# Patient Record
Sex: MALE | Race: WHITE | Employment: FULL TIME | URBAN - METROPOLITAN AREA
[De-identification: names, ages, dates, MRNs, and addresses within clinical notes are randomized per-mention and may not be internally consistent; named-entity substitution may affect disease eponyms.]

---

## 2021-04-23 ENCOUNTER — OFFICE VISIT (OUTPATIENT)
Dept: PRIMARY CARE CLINIC | Age: 21
End: 2021-04-23
Payer: COMMERCIAL

## 2021-04-23 VITALS
HEIGHT: 66 IN | HEART RATE: 68 BPM | WEIGHT: 234.6 LBS | OXYGEN SATURATION: 97 % | DIASTOLIC BLOOD PRESSURE: 88 MMHG | BODY MASS INDEX: 37.7 KG/M2 | SYSTOLIC BLOOD PRESSURE: 138 MMHG

## 2021-04-23 DIAGNOSIS — E66.9 OBESITY (BMI 35.0-39.9 WITHOUT COMORBIDITY): Chronic | ICD-10-CM

## 2021-04-23 DIAGNOSIS — R53.82 CHRONIC FATIGUE: ICD-10-CM

## 2021-04-23 DIAGNOSIS — R63.5 WEIGHT GAIN: ICD-10-CM

## 2021-04-23 DIAGNOSIS — F41.1 GENERALIZED ANXIETY DISORDER: Primary | Chronic | ICD-10-CM

## 2021-04-23 DIAGNOSIS — Z13.1 SCREENING FOR DIABETES MELLITUS: ICD-10-CM

## 2021-04-23 DIAGNOSIS — L65.9 HAIR THINNING: ICD-10-CM

## 2021-04-23 PROCEDURE — 99204 OFFICE O/P NEW MOD 45 MIN: CPT | Performed by: NURSE PRACTITIONER

## 2021-04-23 RX ORDER — CHOLECALCIFEROL (VITAMIN D3) 125 MCG
1 CAPSULE ORAL DAILY
Qty: 30 CAPSULE | Refills: 5 | Status: SHIPPED | OUTPATIENT
Start: 2021-04-23 | End: 2021-08-20 | Stop reason: SDUPTHER

## 2021-04-23 RX ORDER — BUPROPION HYDROCHLORIDE 150 MG/1
150 TABLET ORAL EVERY MORNING
Qty: 30 TABLET | Refills: 3 | Status: SHIPPED | OUTPATIENT
Start: 2021-04-23 | End: 2021-08-20 | Stop reason: SDUPTHER

## 2021-04-23 SDOH — ECONOMIC STABILITY: INCOME INSECURITY: HOW HARD IS IT FOR YOU TO PAY FOR THE VERY BASICS LIKE FOOD, HOUSING, MEDICAL CARE, AND HEATING?: NOT HARD AT ALL

## 2021-04-23 SDOH — ECONOMIC STABILITY: TRANSPORTATION INSECURITY
IN THE PAST 12 MONTHS, HAS THE LACK OF TRANSPORTATION KEPT YOU FROM MEDICAL APPOINTMENTS OR FROM GETTING MEDICATIONS?: NOT ASKED

## 2021-04-23 SDOH — ECONOMIC STABILITY: FOOD INSECURITY: WITHIN THE PAST 12 MONTHS, YOU WORRIED THAT YOUR FOOD WOULD RUN OUT BEFORE YOU GOT MONEY TO BUY MORE.: NEVER TRUE

## 2021-04-23 SDOH — HEALTH STABILITY: MENTAL HEALTH: HOW OFTEN DO YOU HAVE A DRINK CONTAINING ALCOHOL?: 2-4 TIMES A MONTH

## 2021-04-23 SDOH — ECONOMIC STABILITY: FOOD INSECURITY: WITHIN THE PAST 12 MONTHS, THE FOOD YOU BOUGHT JUST DIDN'T LAST AND YOU DIDN'T HAVE MONEY TO GET MORE.: NEVER TRUE

## 2021-04-23 ASSESSMENT — PATIENT HEALTH QUESTIONNAIRE - PHQ9
SUM OF ALL RESPONSES TO PHQ QUESTIONS 1-9: 1
2. FEELING DOWN, DEPRESSED OR HOPELESS: 0
SUM OF ALL RESPONSES TO PHQ QUESTIONS 1-9: 1

## 2021-04-23 NOTE — PROGRESS NOTES
Noted    Obesity (BMI 35.0-39.9 without comorbidity) 04/27/2021    Generalized anxiety disorder 04/23/2021    Pilonidal cyst without abscess 07/01/2014     Patient's BMI is Body mass index is 37.87 kg/m². Social History     Tobacco Use    Smoking status: Never Smoker    Smokeless tobacco: Never Used   Substance Use Topics    Alcohol use: Yes     Alcohol/week: 4.0 standard drinks     Types: 4 Cans of beer per week     Frequency: 2-4 times a month     Drinks per session: 3 or 4    Drug use: Yes     Frequency: 1.0 times per week     Types: Marijuana      Past Medical History:   Diagnosis Date    ADHD (attention deficit hyperactivity disorder)     Anxiety     Asthma     HAS NOT HAD ANY SYMPTOMS IN YEARS    Gestation period, 40 weeks 1998    VAGINAL BIRTH, BIRHT WT 7 LB 6OZ,  FORCEPTS DELIVERY, HOME FROM [facility] MOM    Pilonidal cyst 08/2013    Vision abnormalities       No data recorded     Immunization:    There is no immunization history on file for this patient. Past Surgical History:   Procedure Laterality Date    PILONIDAL CYST EXCISION  7-9-14     Family History   Problem Relation Age of Onset    Anemia Mother     Thyroid Disease Mother     Arrhythmia Father     High Blood Pressure Paternal Grandmother     Other Paternal Grandfather         PNEUMONIA     Current Outpatient Medications   Medication Sig Dispense Refill    buPROPion (WELLBUTRIN XL) 150 MG extended release tablet Take 1 tablet by mouth every morning 30 tablet 3    Cholecalciferol (VITAMIN D) 125 MCG (5000 UT) CAPS Take 1 capsule by mouth daily 30 capsule 5     No current facility-administered medications for this visit. The patient's past medical, surgical, social, and family history as well as his current medications and allergies were reviewed as documented in today's encounter. Review of Systems   Constitutional: Positive for fatigue and unexpected weight change. HENT: Negative. Eyes: Negative. Respiratory: Negative. Cardiovascular: Negative. Gastrointestinal: Negative. Endocrine: Negative. Genitourinary: Negative. Musculoskeletal: Negative. Skin: Negative. Allergic/Immunologic: Negative. Neurological: Negative. Hematological: Negative. Psychiatric/Behavioral: Positive for decreased concentration. The patient is nervous/anxious. All other systems reviewed and are negative. /88 (Site: Left Upper Arm, Position: Sitting, Cuff Size: Large Adult)   Pulse 68   Ht 5' 6\" (1.676 m)   Wt 234 lb 9.6 oz (106.4 kg)   SpO2 97%   BMI 37.87 kg/m²   Physical Exam  Vitals signs and nursing note reviewed. Constitutional:       Appearance: Normal appearance. He is normal weight. HENT:      Right Ear: Tympanic membrane, ear canal and external ear normal.      Left Ear: Tympanic membrane, ear canal and external ear normal.      Nose: Nose normal.      Mouth/Throat:      Mouth: Mucous membranes are moist.      Pharynx: Oropharynx is clear. Eyes:      Extraocular Movements: Extraocular movements intact. Conjunctiva/sclera: Conjunctivae normal.      Pupils: Pupils are equal, round, and reactive to light. Neck:      Musculoskeletal: Normal range of motion. Cardiovascular:      Rate and Rhythm: Normal rate and regular rhythm. Pulses: Normal pulses. Heart sounds: Normal heart sounds. Pulmonary:      Effort: Pulmonary effort is normal.      Breath sounds: Normal breath sounds. Abdominal:      General: Abdomen is flat. Bowel sounds are normal.      Palpations: Abdomen is soft. Musculoskeletal: Normal range of motion. Skin:     General: Skin is warm and dry. Capillary Refill: Capillary refill takes less than 2 seconds. Neurological:      General: No focal deficit present. Mental Status: He is alert.        No results found for: WBC, HGB, HCT, MCV, PLT  No results found for: NA, K, CL, CO2, BUN, CREATININE, GLUCOSE, CALCIUM   No results found for: ALT, AST, GGT, ALKPHOS, BILITOT  No results found for: TSHFT4, TSH  No results found for: CHOL  No results found for: TRIG  No results found for: HDL  No results found for: LDLCALC, LDLCHOLESTEROL  No results found for: CHOLHDLRATIO  No results found for: LABA1C  No results found for: LABECQBE60  No results found for: FOLATE  No results found for: IRON, TIBC, FERRITIN  No results found for: VITD25  I personally reviewed testing with patient. Otherwise labs within normal limits  ASSESSMENT AND PLAN  1. Generalized anxiety disorder    - buPROPion (WELLBUTRIN XL) 150 MG extended release tablet; Take 1 tablet by mouth every morning  Dispense: 30 tablet; Refill: 3    2. Chronic fatigue    - Cholecalciferol (VITAMIN D) 125 MCG (5000 UT) CAPS; Take 1 capsule by mouth daily  Dispense: 30 capsule; Refill: 5  - CBC With Auto Differential; Future  - Basic Metabolic Panel; Future  - TSH With Reflex Ft4; Future  - T3, Free; Future  - Thyroid Peroxidase Antibody; Future    3. Screening for diabetes mellitus    - Basic Metabolic Panel; Future    4. Hair thinning    - CBC With Auto Differential; Future  - Basic Metabolic Panel; Future  - TSH With Reflex Ft4; Future  - T3, Free; Future  - Thyroid Peroxidase Antibody; Future    5. Weight gain    - CBC With Auto Differential; Future  - Basic Metabolic Panel; Future  - TSH With Reflex Ft4; Future  - T3, Free; Future  - Thyroid Peroxidase Antibody; Future    6. Obesity (BMI 35.0-39.9 without comorbidity)  BMI was elevated today, and weight loss plan recommended is : conventional weight loss and daily exercise regimen.      Orders Placed This Encounter   Procedures    CBC With Auto Differential     Standing Status:   Future     Standing Expiration Date:   4/23/2022    Basic Metabolic Panel     Standing Status:   Future     Standing Expiration Date:   4/23/2022    TSH With Reflex Ft4     Standing Status:   Future     Standing Expiration Date:   4/23/2022    T3, Free     Standing Status:   Future     Standing Expiration Date:   4/23/2022    Thyroid Peroxidase Antibody     Standing Status:   Future     Standing Expiration Date:   4/23/2022     Orders Placed This Encounter   Medications    buPROPion (WELLBUTRIN XL) 150 MG extended release tablet     Sig: Take 1 tablet by mouth every morning     Dispense:  30 tablet     Refill:  3    Cholecalciferol (VITAMIN D) 125 MCG (5000 UT) CAPS     Sig: Take 1 capsule by mouth daily     Dispense:  30 capsule     Refill:  5      Data Unavailable  Health Maintenance Due   Topic Date Due    COVID-19 Vaccine (1) Never done    DTaP/Tdap/Td vaccine (1 - Tdap) Never done      Medications Discontinued During This Encounter   Medication Reason    docusate sodium (COLACE) 100 MG capsule LIST CLEANUP    HYDROcodone-acetaminophen ([address]) 5-325 MG per tablet LIST CLEANUP     The patient's past medical, surgical, social, and family history as well as his   current medications and allergies were reviewed as documented in today's encounter. Medications, labs, diagnostic studies, consultations and follow-up as documented in this encounter. Return in about 2 weeks (around 5/7/2021). Future Appointments   Date Time Provider Nixon Perez   5/7/2021  4:00 PM KLAUDIA Carrillo CNP     This note was completed by using the assistance of a speech-recognition program. However, inadvertent computerized transcription errors may be present. Although every effort was made to ensure accuracy, no guarantees can be provided that every mistake has been identified and corrected by editing.   Electronically signed by KLAUDIA Rasmussen CNP on 4/27/2021 at 9:42 AM

## 2021-04-27 PROBLEM — E66.9 OBESITY (BMI 35.0-39.9 WITHOUT COMORBIDITY): Status: ACTIVE | Noted: 2021-04-27

## 2021-04-27 ASSESSMENT — ENCOUNTER SYMPTOMS
RESPIRATORY NEGATIVE: 1
GASTROINTESTINAL NEGATIVE: 1
ALLERGIC/IMMUNOLOGIC NEGATIVE: 1
EYES NEGATIVE: 1

## 2021-04-28 ENCOUNTER — HOSPITAL ENCOUNTER (OUTPATIENT)
Age: 21
Setting detail: SPECIMEN
Discharge: HOME | End: 2021-04-28
Payer: COMMERCIAL

## 2021-04-28 DIAGNOSIS — Z13.1 SCREENING FOR DIABETES MELLITUS: ICD-10-CM

## 2021-04-28 DIAGNOSIS — L65.9 HAIR THINNING: ICD-10-CM

## 2021-04-28 DIAGNOSIS — R53.82 CHRONIC FATIGUE: ICD-10-CM

## 2021-04-28 DIAGNOSIS — R63.5 WEIGHT GAIN: ICD-10-CM

## 2021-04-28 LAB
ABSOLUTE EOS #: 0.07 K/UL (ref 0–0.44)
ABSOLUTE IMMATURE GRANULOCYTE: <0.03 K/UL (ref 0–0.3)
ABSOLUTE LYMPH #: 1.89 K/UL (ref 1.1–3.7)
ABSOLUTE MONO #: 0.54 K/UL (ref 0.1–1.2)
BASOPHILS # BLD: 0 % (ref 0–2)
BASOPHILS ABSOLUTE: 0.03 K/UL (ref 0–0.2)
DIFFERENTIAL TYPE: ABNORMAL
EOSINOPHILS RELATIVE PERCENT: 1 % (ref 1–4)
HCT VFR BLD CALC: 50 % (ref 40.7–50.3)
HEMOGLOBIN: 16.2 G/DL (ref 13–17)
IMMATURE GRANULOCYTES: 0 %
LYMPHOCYTES # BLD: 25 % (ref 24–43)
MCH RBC QN AUTO: 28.2 PG (ref 25.2–33.5)
MCHC RBC AUTO-ENTMCNC: 32.4 G/DL (ref 28.4–34.8)
MCV RBC AUTO: 87.1 FL (ref 82.6–102.9)
MONOCYTES # BLD: 7 % (ref 3–12)
NRBC AUTOMATED: 0 PER 100 WBC
PDW BLD-RTO: 12.7 % (ref 11.8–14.4)
PLATELET # BLD: 214 K/UL (ref 138–453)
PLATELET ESTIMATE: ABNORMAL
PMV BLD AUTO: 11.1 FL (ref 8.1–13.5)
RBC # BLD: 5.74 M/UL (ref 4.21–5.77)
RBC # BLD: ABNORMAL 10*6/UL
SEG NEUTROPHILS: 67 % (ref 36–65)
SEGMENTED NEUTROPHILS ABSOLUTE COUNT: 5.03 K/UL (ref 1.5–8.1)
T3 FREE: 3.81 PG/ML (ref 2.02–4.43)
WBC # BLD: 7.6 K/UL (ref 3.5–11.3)
WBC # BLD: ABNORMAL 10*3/UL

## 2021-04-29 LAB
ANION GAP SERPL CALCULATED.3IONS-SCNC: 12 MMOL/L (ref 9–17)
BUN BLDV-MCNC: 12 MG/DL (ref 6–20)
BUN/CREAT BLD: NORMAL (ref 9–20)
CALCIUM SERPL-MCNC: 9.5 MG/DL (ref 8.6–10.4)
CHLORIDE BLD-SCNC: 101 MMOL/L (ref 98–107)
CO2: 26 MMOL/L (ref 20–31)
CREAT SERPL-MCNC: 0.8 MG/DL (ref 0.7–1.2)
GFR AFRICAN AMERICAN: >60 ML/MIN
GFR NON-AFRICAN AMERICAN: >60 ML/MIN
GFR SERPL CREATININE-BSD FRML MDRD: NORMAL ML/MIN/{1.73_M2}
GFR SERPL CREATININE-BSD FRML MDRD: NORMAL ML/MIN/{1.73_M2}
GLUCOSE BLD-MCNC: 83 MG/DL (ref 70–99)
POTASSIUM SERPL-SCNC: 4.2 MMOL/L (ref 3.7–5.3)
SODIUM BLD-SCNC: 139 MMOL/L (ref 135–144)
TSH SERPL DL<=0.05 MIU/L-ACNC: 1.02 MIU/L (ref 0.3–5)

## 2021-04-30 LAB — THYROID PEROXIDASE (TPO) AB: <4 IU/ML (ref 0–25)

## 2021-05-07 ENCOUNTER — OFFICE VISIT (OUTPATIENT)
Dept: PRIMARY CARE CLINIC | Age: 21
End: 2021-05-07
Payer: COMMERCIAL

## 2021-05-07 VITALS
DIASTOLIC BLOOD PRESSURE: 82 MMHG | WEIGHT: 229.8 LBS | SYSTOLIC BLOOD PRESSURE: 122 MMHG | OXYGEN SATURATION: 97 % | HEART RATE: 72 BPM | HEIGHT: 66 IN | BODY MASS INDEX: 36.93 KG/M2

## 2021-05-07 DIAGNOSIS — F41.1 GENERALIZED ANXIETY DISORDER: Primary | Chronic | ICD-10-CM

## 2021-05-07 DIAGNOSIS — E66.9 OBESITY (BMI 35.0-39.9 WITHOUT COMORBIDITY): Chronic | ICD-10-CM

## 2021-05-07 PROCEDURE — 99213 OFFICE O/P EST LOW 20 MIN: CPT | Performed by: NURSE PRACTITIONER

## 2021-05-07 ASSESSMENT — ENCOUNTER SYMPTOMS
ALLERGIC/IMMUNOLOGIC NEGATIVE: 1
EYES NEGATIVE: 1
RESPIRATORY NEGATIVE: 1
GASTROINTESTINAL NEGATIVE: 1

## 2021-05-07 ASSESSMENT — PATIENT HEALTH QUESTIONNAIRE - PHQ9
SUM OF ALL RESPONSES TO PHQ QUESTIONS 1-9: 0

## 2021-05-07 NOTE — PROGRESS NOTES
[facility] [address] [place] [address] Adalgisa [place] [address]  Dept: [phone]       Abhinav Barahona is a 23 y.o. male who presents to the office today for evaluation of Anxiety and Medication Check (no side effects)    Pt is here for f/u for anxiety. He started wellbutrin 2 weeks ago and has noticed some improvement. He has noticed some headaches as side effects, advised him to increase fluid intake and usually as his body adjusts to medication those will improve. I reviewed labwork with him which was all normal. I would like him to f/u in 2 weeks to reassess, pt understands and agreeable to plan. Patient Active Problem List    Diagnosis Date Noted    Obesity (BMI 35.0-39.9 without comorbidity) 04/27/2021    Generalized anxiety disorder 04/23/2021    Pilonidal cyst without abscess 07/01/2014     Patient's BMI is Body mass index is 37.09 kg/m². Social History     Tobacco Use    Smoking status: Never Smoker    Smokeless tobacco: Never Used   Substance Use Topics    Alcohol use: Yes     Alcohol/week: 4.0 standard drinks     Types: 4 Cans of beer per week     Frequency: 2-4 times a month     Drinks per session: 3 or 4    Drug use: Yes     Frequency: 1.0 times per week     Types: Marijuana      Past Medical History:   Diagnosis Date    ADHD (attention deficit hyperactivity disorder)     Anxiety     Asthma     HAS NOT HAD ANY SYMPTOMS IN YEARS    Gestation period, 40 weeks 1998    VAGINAL BIRTH, BIRHT WT 7 LB 6OZ,  FORCEPTS DELIVERY, HOME FROM [facility] MOM    Pilonidal cyst 08/2013    Vision abnormalities       PHQ-9 Total Score: 0 (5/7/2021  4:07 PM)     Immunization:    There is no immunization history on file for this patient.   Past Surgical History:   Procedure Laterality Date    PILONIDAL CYST EXCISION  7-9-14     Family History   Problem Relation Age of Onset    Anemia Mother     Thyroid Disease Mother     Arrhythmia Father     High Blood Pressure Paternal Grandmother     Other Paternal Grandfather         PNEUMONIA     Current Outpatient Medications   Medication Sig Dispense Refill    buPROPion (WELLBUTRIN XL) 150 MG extended release tablet Take 1 tablet by mouth every morning 30 tablet 3    Cholecalciferol (VITAMIN D) 125 MCG (5000 UT) CAPS Take 1 capsule by mouth daily 30 capsule 5     No current facility-administered medications for this visit. The patient's past medical, surgical, social, and family history as well as his current medications and allergies were reviewed as documented in today's encounter. Review of Systems   Constitutional: Negative. HENT: Negative. Eyes: Negative. Respiratory: Negative. Cardiovascular: Negative. Gastrointestinal: Negative. Endocrine: Negative. Genitourinary: Negative. Musculoskeletal: Negative. Skin: Negative. Allergic/Immunologic: Negative. Neurological: Positive for headaches. Hematological: Negative. Psychiatric/Behavioral: Positive for decreased concentration. The patient is nervous/anxious (improving will wellbutrin). All other systems reviewed and are negative. /82   Pulse 72   Ht 5' 6\" (1.676 m)   Wt 229 lb 12.8 oz (104.2 kg)   SpO2 97%   BMI 37.09 kg/m²   Physical Exam  Vitals signs and nursing note reviewed. Constitutional:       Appearance: Normal appearance. He is normal weight. HENT:      Right Ear: Tympanic membrane, ear canal and external ear normal.      Left Ear: Tympanic membrane, ear canal and external ear normal.      Nose: Nose normal.      Mouth/Throat:      Mouth: Mucous membranes are moist.      Pharynx: Oropharynx is clear. Eyes:      Extraocular Movements: Extraocular movements intact. Conjunctiva/sclera: Conjunctivae normal.      Pupils: Pupils are equal, round, and reactive to light. Neck:      Musculoskeletal: Normal range of motion. Cardiovascular:      Rate and Rhythm: Normal rate and regular rhythm. Pulses: Normal pulses. Heart sounds: Normal heart sounds. Pulmonary:      Effort: Pulmonary effort is normal.      Breath sounds: Normal breath sounds. Abdominal:      General: Abdomen is flat. Bowel sounds are normal.      Palpations: Abdomen is soft. Skin:     General: Skin is warm and dry. Capillary Refill: Capillary refill takes less than 2 seconds. Neurological:      General: No focal deficit present. Mental Status: He is alert. Lab Results   Component Value Date    WBC 7.6 04/28/2021    HGB 16.2 04/28/2021    HCT 50.0 04/28/2021    MCV 87.1 04/28/2021     04/28/2021     Lab Results   Component Value Date     04/28/2021    K 4.2 04/28/2021     04/28/2021    CO2 26 04/28/2021    BUN 12 04/28/2021    CREATININE 0.80 04/28/2021    GLUCOSE 83 04/28/2021    CALCIUM 9.5 04/28/2021      No results found for: ALT, AST, GGT, ALKPHOS, BILITOT  Lab Results   Component Value Date    TSH 1.02 04/28/2021     No results found for: CHOL  No results found for: TRIG  No results found for: HDL  No results found for: LDLCALC, LDLCHOLESTEROL  No results found for: CHOLHDLRATIO  No results found for: LABA1C  No results found for: [id number]  No results found for: FOLATE  No results found for: IRON, TIBC, FERRITIN  No results found for: VITD25  I personally reviewed testing with patient. Otherwise labs within normal limits  ASSESSMENT AND PLAN  1. Generalized anxiety disorder  Improving will wellbutrin, cont current dosage     2. Obesity (BMI 35.0-39.9 without comorbidity)  BMI was elevated today, and weight loss plan recommended is : conventional weight loss and daily exercise regimen. No orders of the defined types were placed in this encounter. No orders of the defined types were placed in this encounter.      Data Unavailable  Health Maintenance Due   Topic Date Due    COVID-19 Vaccine (1) Never done    DTaP/Tdap/Td vaccine (1 - Tdap) Never done      There are no discontinued medications. The patient's past medical, surgical, social, and family history as well as his   current medications and allergies were reviewed as documented in today's encounter. Medications, labs, diagnostic studies, consultations and follow-up as documented in this encounter. Return in about 2 weeks (around 5/21/2021). Future Appointments   Date Time Provider Nixon Perez   5/21/2021  2:00 PM KLAUDIA Jarrett CNP [facility]     This note was completed by using the assistance of a speech-recognition program. However, inadvertent computerized transcription errors may be present. Although every effort was made to ensure accuracy, no guarantees can be provided that every mistake has been identified and corrected by editing.   Electronically signed by KLAUDIA Wood CNP on 5/7/2021 at 4:27 PM

## 2021-05-21 ENCOUNTER — TELEMEDICINE (OUTPATIENT)
Dept: PRIMARY CARE CLINIC | Age: 21
End: 2021-05-21
Payer: COMMERCIAL

## 2021-05-21 DIAGNOSIS — F41.1 GENERALIZED ANXIETY DISORDER: Primary | Chronic | ICD-10-CM

## 2021-05-21 PROCEDURE — 99422 OL DIG E/M SVC 11-20 MIN: CPT | Performed by: NURSE PRACTITIONER

## 2021-05-21 NOTE — PROGRESS NOTES
Anemia Mother     Thyroid Disease Mother     Arrhythmia Father     High Blood Pressure Paternal Grandmother     Other Paternal Grandfather         PNEUMONIA   ,   There is no immunization history on file for this patient.,   Health Maintenance   Topic Date Due    COVID-19 Vaccine (1) Never done    DTaP/Tdap/Td vaccine (1 - Tdap) Never done    HPV vaccine (1 - Male 2-dose series) 04/27/2022 (Originally 8/6/2009)    Flu vaccine (Season Ended) 09/01/2021    Hepatitis A vaccine  Aged Out    Hepatitis B vaccine  Aged Out    Hib vaccine  Aged Out    Meningococcal (ACWY) vaccine  Aged Out    Pneumococcal 0-64 years Vaccine  Aged Out    Varicella vaccine  Discontinued    Hepatitis C screen  Discontinued    HIV screen  Discontinued       PHYSICAL EXAMINATION:    Constitutional: [x] Appears well-developed and well-nourished [x] No apparent distress      Mental status  [x] Alert and awake  [x] Oriented to person/place/time [x]Able to follow commands      Pulmonary/Chest: [x] Respiratory effort normal.  [x] No visualized signs of difficulty breathing or respiratory distress       Neurological:        [x] No Facial Asymmetry (Cranial nerve 7 motor function) (limited exam to video visit)          [] No gaze palsy        [] Abnormal-          Psychiatric:       [x] Normal Affect [x] No Hallucinations       Other pertinent observable physical exam findings-     ASSESSMENT/PLAN:  1. Generalized anxiety disorder  Improving, weill cont on current dosage of wellbutrin and f/u in 3 months. Pt advised to call me sooner with any questions, concerns, or if symptoms worsen. Pt understands and agreeable to plan. No follow-ups on file. Daytonlorelei Del Valle, was evaluated through a synchronous (real-time) audio-video encounter. The patient (or guardian if applicable) is aware that this is a billable service. Verbal consent to proceed has been obtained within the past 12 months.  The visit was conducted pursuant to the emergency declaration under the [facility], [address] [place] [address] authority and the [email or web address] General Act. Patient identification was verified, and a caregiver was present when appropriate. The patient was located in a state where the provider was credentialed to provide care. Total time spent on this encounter: Not billed by time    --KLAUDIA Perez CNP on 5/21/2021 at 2:12 PM    An electronic signature was used to authenticate this note.

## 2021-08-20 ENCOUNTER — OFFICE VISIT (OUTPATIENT)
Dept: PRIMARY CARE CLINIC | Age: 21
End: 2021-08-20
Payer: COMMERCIAL

## 2021-08-20 VITALS
HEIGHT: 66 IN | SYSTOLIC BLOOD PRESSURE: 128 MMHG | WEIGHT: 235.8 LBS | OXYGEN SATURATION: 95 % | HEART RATE: 91 BPM | BODY MASS INDEX: 37.9 KG/M2 | DIASTOLIC BLOOD PRESSURE: 82 MMHG

## 2021-08-20 DIAGNOSIS — K21.9 GASTROESOPHAGEAL REFLUX DISEASE WITHOUT ESOPHAGITIS: Chronic | ICD-10-CM

## 2021-08-20 DIAGNOSIS — F90.0 ADHD (ATTENTION DEFICIT HYPERACTIVITY DISORDER), INATTENTIVE TYPE: Primary | Chronic | ICD-10-CM

## 2021-08-20 DIAGNOSIS — R53.82 CHRONIC FATIGUE: Chronic | ICD-10-CM

## 2021-08-20 DIAGNOSIS — F41.1 GENERALIZED ANXIETY DISORDER: Chronic | ICD-10-CM

## 2021-08-20 PROCEDURE — 99214 OFFICE O/P EST MOD 30 MIN: CPT | Performed by: NURSE PRACTITIONER

## 2021-08-20 RX ORDER — BUPROPION HYDROCHLORIDE 150 MG/1
150 TABLET ORAL EVERY MORNING
Qty: 30 TABLET | Refills: 3 | Status: SHIPPED | OUTPATIENT
Start: 2021-08-20 | End: 2021-09-14 | Stop reason: SDUPTHER

## 2021-08-20 RX ORDER — OMEPRAZOLE 20 MG/1
20 CAPSULE, DELAYED RELEASE ORAL
Qty: 30 CAPSULE | Refills: 5 | Status: SHIPPED | OUTPATIENT
Start: 2021-08-20 | End: 2022-07-19

## 2021-08-20 RX ORDER — CHOLECALCIFEROL (VITAMIN D3) 125 MCG
1 CAPSULE ORAL DAILY
Qty: 30 CAPSULE | Refills: 5 | Status: SHIPPED | OUTPATIENT
Start: 2021-08-20 | End: 2021-11-29 | Stop reason: SDUPTHER

## 2021-08-20 RX ORDER — DEXTROAMPHETAMINE SACCHARATE, AMPHETAMINE ASPARTATE MONOHYDRATE, DEXTROAMPHETAMINE SULFATE AND AMPHETAMINE SULFATE 2.5; 2.5; 2.5; 2.5 MG/1; MG/1; MG/1; MG/1
10 CAPSULE, EXTENDED RELEASE ORAL DAILY
Qty: 30 CAPSULE | Refills: 0 | Status: SHIPPED | OUTPATIENT
Start: 2021-08-20 | End: 2021-09-27 | Stop reason: SDUPTHER

## 2021-08-20 ASSESSMENT — ENCOUNTER SYMPTOMS
ALLERGIC/IMMUNOLOGIC NEGATIVE: 1
RESPIRATORY NEGATIVE: 1
GASTROINTESTINAL NEGATIVE: 1
EYES NEGATIVE: 1

## 2021-08-20 NOTE — PROGRESS NOTES
Che Michaels [address] [place] [address] [facility] [id number]  Dept: [phone]       Maddy Llamas is a 19 y.o. adult who presents to the office today for evaluation of Anxiety, ADHD, and Gastroesophageal Reflux    Pt here for f/u. Doing well on wellbutrin which has improved anxiety, vitamin d has improved fatigue. Pt started weight lifting. He has been noticed GERD symptoms, worse at night. Also has been wanting to get on something for adhd for awhile, was dx as a child and treated with focalin, hasn't taken meds in awhile, would like to try something else. Discussed risks/benfits and filled out controlled substance agreement. Patient Active Problem List    Diagnosis Date Noted    Gastroesophageal reflux disease without esophagitis 08/20/2021    ADHD (attention deficit hyperactivity disorder), inattentive type 08/20/2021    Chronic fatigue 08/20/2021    Obesity (BMI 35.0-39.9 without comorbidity) 04/27/2021    Generalized anxiety disorder 04/23/2021    Pilonidal cyst without abscess 07/01/2014     Patient's BMI is Body mass index is 38.06 kg/m². Social History     Tobacco Use    Smoking status: Never Smoker    Smokeless tobacco: Never Used   Vaping Use    Vaping Use: Never used   Substance Use Topics    Alcohol use:  Yes     Alcohol/week: 4.0 standard drinks     Types: 4 Cans of beer per week    Drug use: Yes     Frequency: 1.0 times per week     Types: Marijuana      Past Medical History:   Diagnosis Date    ADHD (attention deficit hyperactivity disorder)     Anxiety     Asthma     HAS NOT HAD ANY SYMPTOMS IN YEARS    Gestation period, 40 weeks 1998    VAGINAL BIRTH, BIRHT WT 7 LB 6OZ,  FORCEPTS DELIVERY, HOME FROM [facility] MOM    Pilonidal cyst 08/2013    Vision abnormalities       No data recorded   Immunization:  Immunization History   Administered Date(s) Administered    COVID-19, Moderna, PF, 100mcg/0.5mL 04/01/2021, 04/29/2021     Past Surgical History:   Procedure Laterality Date    PILONIDAL CYST EXCISION  7-9-14     Family History   Problem Relation Age of Onset    Anemia Mother     Thyroid Disease Mother     Arrhythmia Father     High Blood Pressure Paternal Grandmother     Other Paternal Grandfather         PNEUMONIA     Current Outpatient Medications   Medication Sig Dispense Refill    buPROPion (WELLBUTRIN XL) 150 MG extended release tablet Take 1 tablet by mouth every morning 30 tablet 3    Cholecalciferol (VITAMIN D) 125 MCG (5000 UT) CAPS Take 1 capsule by mouth daily 30 capsule 5    amphetamine-dextroamphetamine (ADDERALL XR) 10 MG extended release capsule Take 1 capsule by mouth daily for 30 days. 30 capsule 0    omeprazole (PRILOSEC) 20 MG delayed release capsule Take 1 capsule by mouth every morning (before breakfast) 30 capsule 5     No current facility-administered medications for this visit. The patient's past medical, surgical, social, and family history as well as Claudia Padilla current medications and allergies were reviewed as documented in today's encounter. Review of Systems   Constitutional: Negative. HENT: Negative. Eyes: Negative. Respiratory: Negative. Cardiovascular: Negative. Gastrointestinal: Negative. GERD     Endocrine: Negative. Genitourinary: Negative. Musculoskeletal: Negative. Skin: Negative. Allergic/Immunologic: Negative. Neurological: Negative. Hematological: Negative. Psychiatric/Behavioral: Positive for decreased concentration. All other systems reviewed and are negative. /82 (Site: Left Upper Arm, Position: Sitting, Cuff Size: Large Adult)   Pulse 91   Ht 5' 6\" (1.676 m)   Wt 235 lb 12.8 oz (107 kg)   SpO2 95%   BMI 38.06 kg/m²  BMI was elevated today, and weight loss plan recommended is : conventional weight loss and daily exercise regimen. Physical Exam  Vitals and nursing note reviewed.    Constitutional:       Appearance: Normal appearance. Laureen Bailey is normal weight. HENT:      Right Ear: Tympanic membrane, ear canal and external ear normal.      Left Ear: Tympanic membrane, ear canal and external ear normal.      Nose: Nose normal.      Mouth/Throat:      Mouth: Mucous membranes are moist.      Pharynx: Oropharynx is clear. Eyes:      Extraocular Movements: Extraocular movements intact. Conjunctiva/sclera: Conjunctivae normal.      Pupils: Pupils are equal, round, and reactive to light. Cardiovascular:      Rate and Rhythm: Normal rate and regular rhythm. Pulses: Normal pulses. Heart sounds: Normal heart sounds. Pulmonary:      Effort: Pulmonary effort is normal.      Breath sounds: Normal breath sounds. Abdominal:      General: Abdomen is flat. Bowel sounds are normal.      Palpations: Abdomen is soft. Musculoskeletal:      Cervical back: Normal range of motion. Skin:     General: Skin is warm and dry. Capillary Refill: Capillary refill takes less than 2 seconds. Neurological:      General: No focal deficit present. Mental Status: Laureen Bailey is alert. Psychiatric:         Mood and Affect: Mood normal.         Behavior: Behavior normal.         Thought Content:  Thought content normal.       Lab Results   Component Value Date    WBC 7.6 04/28/2021    HGB 16.2 04/28/2021    HCT 50.0 04/28/2021    MCV 87.1 04/28/2021     04/28/2021     Lab Results   Component Value Date     04/28/2021    K 4.2 04/28/2021     04/28/2021    CO2 26 04/28/2021    BUN 12 04/28/2021    CREATININE 0.80 04/28/2021    GLUCOSE 83 04/28/2021    CALCIUM 9.5 04/28/2021      No results found for: ALT, AST, GGT, ALKPHOS, BILITOT  Lab Results   Component Value Date    TSH 1.02 04/28/2021     No results found for: CHOL  No results found for: TRIG  No results found for: HDL  No results found for: LDLCALC, LDLCHOLESTEROL  No results found for: CHOLHDLRATIO  No results found for: LABA1C  No results found for: Yossi Otto  No results found for: FOLATE  No results found for: IRON, TIBC, FERRITIN  No results found for: VITD25  I personally reviewed testing with patient. Otherwise labs within normal limits  ASSESSMENT AND PLAN  1. Generalized anxiety disorder    - buPROPion (WELLBUTRIN XL) 150 MG extended release tablet; Take 1 tablet by mouth every morning  Dispense: 30 tablet; Refill: 3    2. Chronic fatigue    - Cholecalciferol (VITAMIN D) 125 MCG (5000 UT) CAPS; Take 1 capsule by mouth daily  Dispense: 30 capsule; Refill: 5    3. ADHD (attention deficit hyperactivity disorder), inattentive type    - amphetamine-dextroamphetamine (ADDERALL XR) 10 MG extended release capsule; Take 1 capsule by mouth daily for 30 days. Dispense: 30 capsule; Refill: 0    4. Gastroesophageal reflux disease without esophagitis    - omeprazole (PRILOSEC) 20 MG delayed release capsule; Take 1 capsule by mouth every morning (before breakfast)  Dispense: 30 capsule; Refill: 5     No orders of the defined types were placed in this encounter. Orders Placed This Encounter   Medications    buPROPion (WELLBUTRIN XL) 150 MG extended release tablet     Sig: Take 1 tablet by mouth every morning     Dispense:  30 tablet     Refill:  3    Cholecalciferol (VITAMIN D) 125 MCG (5000 UT) CAPS     Sig: Take 1 capsule by mouth daily     Dispense:  30 capsule     Refill:  5    amphetamine-dextroamphetamine (ADDERALL XR) 10 MG extended release capsule     Sig: Take 1 capsule by mouth daily for 30 days. Dispense:  30 capsule     Refill:  0    omeprazole (PRILOSEC) 20 MG delayed release capsule     Sig: Take 1 capsule by mouth every morning (before breakfast)     Dispense:  30 capsule     Refill:  5      Data Unavailable  There are no preventive care reminders to display for this patient.    Medications Discontinued During This Encounter   Medication Reason    buPROPion (WELLBUTRIN XL) 150 MG extended release tablet REORDER    Cholecalciferol

## 2021-09-14 DIAGNOSIS — F41.1 GENERALIZED ANXIETY DISORDER: Chronic | ICD-10-CM

## 2021-09-14 RX ORDER — BUPROPION HYDROCHLORIDE 150 MG/1
150 TABLET ORAL EVERY MORNING
Qty: 30 TABLET | Refills: 3 | Status: SHIPPED | OUTPATIENT
Start: 2021-09-14 | End: 2021-11-08 | Stop reason: SDUPTHER

## 2021-09-24 DIAGNOSIS — F90.0 ADHD (ATTENTION DEFICIT HYPERACTIVITY DISORDER), INATTENTIVE TYPE: Chronic | ICD-10-CM

## 2021-09-27 RX ORDER — DEXTROAMPHETAMINE SACCHARATE, AMPHETAMINE ASPARTATE MONOHYDRATE, DEXTROAMPHETAMINE SULFATE AND AMPHETAMINE SULFATE 2.5; 2.5; 2.5; 2.5 MG/1; MG/1; MG/1; MG/1
10 CAPSULE, EXTENDED RELEASE ORAL DAILY
Qty: 30 CAPSULE | Refills: 0 | Status: SHIPPED | OUTPATIENT
Start: 2021-09-27 | End: 2021-11-29 | Stop reason: SDUPTHER

## 2021-11-08 DIAGNOSIS — F41.1 GENERALIZED ANXIETY DISORDER: Chronic | ICD-10-CM

## 2021-11-08 RX ORDER — BUPROPION HYDROCHLORIDE 150 MG/1
150 TABLET ORAL EVERY MORNING
Qty: 30 TABLET | Refills: 3 | Status: SHIPPED | OUTPATIENT
Start: 2021-11-08

## 2021-11-29 ENCOUNTER — OFFICE VISIT (OUTPATIENT)
Dept: PRIMARY CARE CLINIC | Age: 21
End: 2021-11-29
Payer: COMMERCIAL

## 2021-11-29 VITALS
DIASTOLIC BLOOD PRESSURE: 90 MMHG | SYSTOLIC BLOOD PRESSURE: 138 MMHG | HEIGHT: 66 IN | WEIGHT: 233 LBS | HEART RATE: 91 BPM | OXYGEN SATURATION: 96 % | BODY MASS INDEX: 37.45 KG/M2

## 2021-11-29 DIAGNOSIS — F90.0 ADHD (ATTENTION DEFICIT HYPERACTIVITY DISORDER), INATTENTIVE TYPE: Primary | Chronic | ICD-10-CM

## 2021-11-29 DIAGNOSIS — F41.9 ANXIETY: ICD-10-CM

## 2021-11-29 DIAGNOSIS — R53.82 CHRONIC FATIGUE: Chronic | ICD-10-CM

## 2021-11-29 DIAGNOSIS — K21.9 GASTROESOPHAGEAL REFLUX DISEASE WITHOUT ESOPHAGITIS: ICD-10-CM

## 2021-11-29 PROCEDURE — 99214 OFFICE O/P EST MOD 30 MIN: CPT | Performed by: FAMILY MEDICINE

## 2021-11-29 RX ORDER — CHOLECALCIFEROL (VITAMIN D3) 125 MCG
1 CAPSULE ORAL DAILY
Qty: 30 CAPSULE | Refills: 5 | Status: SHIPPED | OUTPATIENT
Start: 2021-11-29

## 2021-11-29 RX ORDER — DEXTROAMPHETAMINE SACCHARATE, AMPHETAMINE ASPARTATE MONOHYDRATE, DEXTROAMPHETAMINE SULFATE AND AMPHETAMINE SULFATE 2.5; 2.5; 2.5; 2.5 MG/1; MG/1; MG/1; MG/1
10 CAPSULE, EXTENDED RELEASE ORAL DAILY
Qty: 30 CAPSULE | Refills: 0 | Status: SHIPPED | OUTPATIENT
Start: 2021-11-29 | End: 2021-12-15 | Stop reason: SDUPTHER

## 2021-11-29 ASSESSMENT — PATIENT HEALTH QUESTIONNAIRE - PHQ9
SUM OF ALL RESPONSES TO PHQ QUESTIONS 1-9: 0
1. LITTLE INTEREST OR PLEASURE IN DOING THINGS: 0
2. FEELING DOWN, DEPRESSED OR HOPELESS: 0
SUM OF ALL RESPONSES TO PHQ QUESTIONS 1-9: 0
SUM OF ALL RESPONSES TO PHQ9 QUESTIONS 1 & 2: 0
SUM OF ALL RESPONSES TO PHQ QUESTIONS 1-9: 0

## 2021-11-29 ASSESSMENT — ENCOUNTER SYMPTOMS
COUGH: 0
SHORTNESS OF BREATH: 0
SORE THROAT: 0
BACK PAIN: 0
CONSTIPATION: 0
DIARRHEA: 0
CHEST TIGHTNESS: 0

## 2021-11-29 NOTE — PATIENT INSTRUCTIONS
WEIGHT LOSS    Recommend 10 % weight loss over 3 months    Low glycemic index diet    Avoid naked carbohydrates, e.g. peanut butter added to small banana    Avoid carbohydrate to fiber ratio more than 10:1    Whole oatmeal at least every other day at breakfast    May have 6 eggs per week    Focus on 25 gms of protein at breakfast    Avoid carbos first thing in morning to limit glucose surge    Limit animal protein    Limit dairy    Spinach salad daily    Try for 25-50 grams of fiber per day    Recommend [employer] анна on [employer]

## 2021-11-29 NOTE — PROGRESS NOTES
Subjective:     Patient ID: Hui Cortes is a 19 y.o. adult    HPI  Raul Esteves is a  for [employer] who presents today for his ADHD he has GERD anxiety and chronic fatigue. He has been taking Adderall with some success and has noted improvement in his attentiveness. Also his fatigue seems to be better and he is less anxious. We discussed his GERD and using a PPI and his concerns of potential side effects. We talked about a holiday off the medicine on the weekends and he verbalized understanding  He denies any symptoms of Adderall intolerance including palpitations chest pain shortness of breath or diarrhea. We spent a while talking about his weight and reducing risk by losing about 10%. We covered in patient instructions a plan for 10% weight loss over the next 3 months and he expressed interest in lifestyle modifications. He is working out with [employer] training on a regular basis. We also discussed hair loss with him. Avoid caps visors and stress and strain on the scalp. Talked about scalp massage. We talked about shampoo vitamins and potential alopecia foam.  Review of Systems   Constitutional: Negative for activity change, appetite change, fatigue and fever. HENT: Negative for sore throat. Eyes: Negative for visual disturbance. Respiratory: Negative for cough, chest tightness and shortness of breath. Cardiovascular: Negative for chest pain, palpitations and leg swelling. Gastrointestinal: Negative for constipation and diarrhea. Genitourinary: Negative for dysuria and urgency. Musculoskeletal: Negative for back pain. Neurological: Negative for dizziness, syncope and headaches. Hematological: Does not bruise/bleed easily. Psychiatric/Behavioral: Positive for decreased concentration. Negative for confusion and sleep disturbance. The patient is not nervous/anxious.               Objective:     Physical Exam  Constitutional:       Appearance: Normal appearance. HENT:      Head: Normocephalic. Right Ear: External ear normal.      Left Ear: External ear normal.      Nose: Nose normal.      Mouth/Throat:      Mouth: Mucous membranes are moist.      Pharynx: Oropharynx is clear. Eyes:      Conjunctiva/sclera: Conjunctivae normal.   Cardiovascular:      Rate and Rhythm: Normal rate and regular rhythm. Pulses: Normal pulses. Heart sounds: Normal heart sounds. No murmur heard. Pulmonary:      Effort: Pulmonary effort is normal.      Breath sounds: Normal breath sounds. Musculoskeletal:         General: Normal range of motion. Cervical back: Neck supple. Right lower leg: No edema. Left lower leg: No edema. Lymphadenopathy:      Cervical: No cervical adenopathy. Skin:     General: Skin is warm and dry. Capillary Refill: Capillary refill takes less than 2 seconds. Neurological:      General: No focal deficit present. Mental Status: Lucille Carrillo \"Max\" is alert and oriented to person, place, and time. Psychiatric:         Mood and Affect: Mood normal.         Behavior: Behavior normal.         T3, Free   Date Value Ref Range Status   04/28/2021 3.81 2.02 - 4.43 pg/mL Final         Assessment/Plan:     1. ADHD (attention deficit hyperactivity disorder), inattentive type Inadequately Controlled   2. Chronic fatigue Improving   3. Anxiety    4. Gastroesophageal reflux disease without esophagitis    5. BMI 37.0-37.9, adult            [facility] was seen today for new patient, adhd, anxiety, other, other and gastroesophageal reflux. Diagnoses and all orders for this visit:    ADHD (attention deficit hyperactivity disorder), inattentive type  Recommend that we continue his  Adderall  Chronic fatigue  Generally better  Anxiety  Generally better  Gastroesophageal reflux disease without esophagitis  Discussed drug holiday off of the omeprazole.   Should improve with weight loss  BMI 37.0-37.9, adult  Discussed the 10% weight loss over the next 3 months      Return to office in 3 months      Joe Rodriguez MD    Please note that this chart was generated using voice recognition Dragon dictation software. Although every effort was made to ensure the accuracy of this automated transcription, some errors in transcription may have occurred.

## 2021-12-15 DIAGNOSIS — F90.0 ADHD (ATTENTION DEFICIT HYPERACTIVITY DISORDER), INATTENTIVE TYPE: Chronic | ICD-10-CM

## 2021-12-15 RX ORDER — DEXTROAMPHETAMINE SACCHARATE, AMPHETAMINE ASPARTATE MONOHYDRATE, DEXTROAMPHETAMINE SULFATE AND AMPHETAMINE SULFATE 2.5; 2.5; 2.5; 2.5 MG/1; MG/1; MG/1; MG/1
10 CAPSULE, EXTENDED RELEASE ORAL DAILY
Qty: 30 CAPSULE | Refills: 0 | Status: SHIPPED | OUTPATIENT
Start: 2022-01-28 | End: 2022-07-19 | Stop reason: SDUPTHER

## 2021-12-15 RX ORDER — DEXTROAMPHETAMINE SACCHARATE, AMPHETAMINE ASPARTATE MONOHYDRATE, DEXTROAMPHETAMINE SULFATE AND AMPHETAMINE SULFATE 2.5; 2.5; 2.5; 2.5 MG/1; MG/1; MG/1; MG/1
10 CAPSULE, EXTENDED RELEASE ORAL DAILY
Qty: 30 CAPSULE | Refills: 0 | Status: SHIPPED | OUTPATIENT
Start: 2021-12-29 | End: 2022-03-12 | Stop reason: SDUPTHER

## 2021-12-15 NOTE — TELEPHONE ENCOUNTER
Patient needing scripts for 12/29 and for January for his adderall. Follow up appt set up for feb. Only qued up 1 month at appt.

## 2022-01-06 DIAGNOSIS — F90.0 ADHD (ATTENTION DEFICIT HYPERACTIVITY DISORDER), INATTENTIVE TYPE: Chronic | ICD-10-CM

## 2022-01-06 RX ORDER — DEXTROAMPHETAMINE SACCHARATE, AMPHETAMINE ASPARTATE MONOHYDRATE, DEXTROAMPHETAMINE SULFATE AND AMPHETAMINE SULFATE 2.5; 2.5; 2.5; 2.5 MG/1; MG/1; MG/1; MG/1
10 CAPSULE, EXTENDED RELEASE ORAL DAILY
Qty: 30 CAPSULE | Refills: 0 | Status: CANCELLED | OUTPATIENT
Start: 2022-01-06 | End: 2022-02-05

## 2022-01-06 NOTE — TELEPHONE ENCOUNTER
Declined medication refill as the scripts have already been sent, called and let pt know and pt verbalized understanding

## 2022-03-12 DIAGNOSIS — F90.0 ADHD (ATTENTION DEFICIT HYPERACTIVITY DISORDER), INATTENTIVE TYPE: Chronic | ICD-10-CM

## 2022-03-14 RX ORDER — DEXTROAMPHETAMINE SACCHARATE, AMPHETAMINE ASPARTATE MONOHYDRATE, DEXTROAMPHETAMINE SULFATE AND AMPHETAMINE SULFATE 2.5; 2.5; 2.5; 2.5 MG/1; MG/1; MG/1; MG/1
10 CAPSULE, EXTENDED RELEASE ORAL DAILY
Qty: 30 CAPSULE | Refills: 0 | Status: SHIPPED | OUTPATIENT
Start: 2022-03-14 | End: 2022-07-19

## 2022-07-19 ENCOUNTER — OFFICE VISIT (OUTPATIENT)
Dept: PRIMARY CARE CLINIC | Age: 22
End: 2022-07-19
Payer: COMMERCIAL

## 2022-07-19 VITALS
SYSTOLIC BLOOD PRESSURE: 112 MMHG | OXYGEN SATURATION: 98 % | WEIGHT: 228 LBS | HEART RATE: 65 BPM | DIASTOLIC BLOOD PRESSURE: 80 MMHG | BODY MASS INDEX: 36.8 KG/M2

## 2022-07-19 DIAGNOSIS — F41.9 ANXIETY: ICD-10-CM

## 2022-07-19 DIAGNOSIS — F90.0 ADHD (ATTENTION DEFICIT HYPERACTIVITY DISORDER), INATTENTIVE TYPE: Chronic | ICD-10-CM

## 2022-07-19 DIAGNOSIS — R53.82 CHRONIC FATIGUE: Primary | ICD-10-CM

## 2022-07-19 PROCEDURE — 99214 OFFICE O/P EST MOD 30 MIN: CPT | Performed by: FAMILY MEDICINE

## 2022-07-19 RX ORDER — DEXTROAMPHETAMINE SACCHARATE, AMPHETAMINE ASPARTATE MONOHYDRATE, DEXTROAMPHETAMINE SULFATE AND AMPHETAMINE SULFATE 2.5; 2.5; 2.5; 2.5 MG/1; MG/1; MG/1; MG/1
10 CAPSULE, EXTENDED RELEASE ORAL DAILY
Qty: 30 CAPSULE | Refills: 0 | Status: SHIPPED | OUTPATIENT
Start: 2022-07-19 | End: 2022-08-18

## 2022-07-19 SDOH — ECONOMIC STABILITY: FOOD INSECURITY: WITHIN THE PAST 12 MONTHS, YOU WORRIED THAT YOUR FOOD WOULD RUN OUT BEFORE YOU GOT MONEY TO BUY MORE.: NEVER TRUE

## 2022-07-19 SDOH — ECONOMIC STABILITY: FOOD INSECURITY: WITHIN THE PAST 12 MONTHS, THE FOOD YOU BOUGHT JUST DIDN'T LAST AND YOU DIDN'T HAVE MONEY TO GET MORE.: NEVER TRUE

## 2022-07-19 ASSESSMENT — PATIENT HEALTH QUESTIONNAIRE - PHQ9
SUM OF ALL RESPONSES TO PHQ9 QUESTIONS 1 & 2: 0
SUM OF ALL RESPONSES TO PHQ QUESTIONS 1-9: 0
2. FEELING DOWN, DEPRESSED OR HOPELESS: NOT AT ALL
1. LITTLE INTEREST OR PLEASURE IN DOING THINGS: 0
2. FEELING DOWN, DEPRESSED OR HOPELESS: 0
SUM OF ALL RESPONSES TO PHQ QUESTIONS 1-9: 0
SUM OF ALL RESPONSES TO PHQ9 QUESTIONS 1 & 2: 0
DEPRESSION UNABLE TO ASSESS: YES
1. LITTLE INTEREST OR PLEASURE IN DOING THINGS: NOT AT ALL

## 2022-07-19 ASSESSMENT — ENCOUNTER SYMPTOMS
SORE THROAT: 0
ABDOMINAL PAIN: 0
WHEEZING: 0
SHORTNESS OF BREATH: 0

## 2022-07-19 ASSESSMENT — SOCIAL DETERMINANTS OF HEALTH (SDOH): HOW HARD IS IT FOR YOU TO PAY FOR THE VERY BASICS LIKE FOOD, HOUSING, MEDICAL CARE, AND HEATING?: NOT HARD AT ALL

## 2022-07-19 NOTE — PROGRESS NOTES
Subjective:     Patient ID: Jg Bartlett is a 19 y.o. adult    HPI  Adam returns for a follow-up on his ADHD as well as anxiety. Tingling in fingers better     working out  less back pain has lost about 5 pounds since the last visit. Has had a job change much less stress overall feels much better  Denies chest pain palpitations anxiety restlessness sleep disturbance or other symptoms of stimulant excess      Review of Systems   Constitutional:  Negative for fever. HENT:  Negative for congestion, ear pain and sore throat. Respiratory:  Negative for shortness of breath and wheezing. Cardiovascular:  Negative for chest pain and leg swelling. Gastrointestinal:  Negative for abdominal pain. Genitourinary:  Negative for dysuria. Skin:  Negative for rash. Neurological:  Negative for syncope. Hematological:  Negative for adenopathy. Psychiatric/Behavioral:  Positive for decreased concentration. Negative for sleep disturbance. The patient is not nervous/anxious. Objective:     Physical Exam  Vitals and nursing note reviewed. Constitutional:       General: Jg Bartlett \"Max\" is not in acute distress. Appearance: Normal appearance. Jg Bartlett \"Max\" is obese. HENT:      Head: Normocephalic. Right Ear: External ear normal.      Left Ear: External ear normal.      Nose: Nose normal.      Mouth/Throat:      Mouth: Mucous membranes are moist.   Eyes:      Conjunctiva/sclera: Conjunctivae normal.   Cardiovascular:      Rate and Rhythm: Normal rate and regular rhythm. Heart sounds: Normal heart sounds. Pulmonary:      Effort: Pulmonary effort is normal.      Breath sounds: Normal breath sounds. Musculoskeletal:      Cervical back: Normal range of motion. Right lower leg: No edema. Left lower leg: No edema. Skin:     General: Skin is warm and dry. Neurological:      General: No focal deficit present. Mental Status:  gJ Bartlett \"Max\" is alert and oriented to person, place, and time. Psychiatric:         Mood and Affect: Mood normal.         Behavior: Behavior normal.       Assessment/Plan:     1. Chronic fatigue    2. ADHD (attention deficit hyperactivity disorder), inattentive type Inadequately Controlled   3. Anxiety          Andre was seen today for adhd, fatigue and anxiety. Diagnoses and all orders for this visit:    Chronic fatigue  Overall much better with lifestyle modification  ADHD (attention deficit hyperactivity disorder), inattentive type  Refill Adderall. We will need medication agreement. We will need urine drug test at the next visit  Anxiety  Continue Wellbutrin    Patient was made aware that controlled substance medicine should only be prescribed 1 month at a time for 3 months. Patient needs to be seen for an appointment in the office every 3 months for proper monitoring of potential side effects. Patient is also aware that medication agreement needs to be updated once a year and that they may be subject to random drug testing for compliance. Patient verbalized understanding and agreement. Jesse Davis MD    Please note that this chart was generated using voice recognition Dragon dictation software. Although every effort was made to ensure the accuracy of this automated transcription, some errors in transcription may have occurred.